# Patient Record
Sex: FEMALE | URBAN - METROPOLITAN AREA
[De-identification: names, ages, dates, MRNs, and addresses within clinical notes are randomized per-mention and may not be internally consistent; named-entity substitution may affect disease eponyms.]

---

## 2023-09-20 ENCOUNTER — TELEPHONE (OUTPATIENT)
Age: 38
End: 2023-09-20

## 2023-09-20 NOTE — TELEPHONE ENCOUNTER
----- Message from Thi Hema sent at 9/19/2023  2:49 PM EDT -----  Subject: Message to Provider    QUESTIONS  Information for Provider? 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 PCP comes highly recommend and Pt rally ants to est   new care with her. Please contact Pt to schedule a NP appt as soon as PCP   is accepting NP's before Dec if possible per Pt's request.   ---------------------------------------------------------------------------  --------------  Kyrstal Marker BRIANXE  [phone]; OK to leave message on voicemail,Do not leave any message,   patient will call back for answer  ---------------------------------------------------------------------------  --------------  SCRIPT ANSWERS  Relationship to Patient?  Self

## 2023-09-20 NOTE — TELEPHONE ENCOUNTER
This writer called the pt to inform her that Dr. Cristin Greene currently does not have any NP appts and she can call back at the end of October to attempt to schedule this appt.

## 2023-12-09 SDOH — HEALTH STABILITY: PHYSICAL HEALTH: ON AVERAGE, HOW MANY MINUTES DO YOU ENGAGE IN EXERCISE AT THIS LEVEL?: 30 MIN

## 2023-12-09 SDOH — HEALTH STABILITY: PHYSICAL HEALTH: ON AVERAGE, HOW MANY DAYS PER WEEK DO YOU ENGAGE IN MODERATE TO STRENUOUS EXERCISE (LIKE A BRISK WALK)?: 4 DAYS

## 2023-12-12 ENCOUNTER — OFFICE VISIT (OUTPATIENT)
Dept: PRIMARY CARE CLINIC | Facility: CLINIC | Age: 38
End: 2023-12-12
Payer: COMMERCIAL

## 2023-12-12 VITALS
WEIGHT: 175 LBS | OXYGEN SATURATION: 97 % | DIASTOLIC BLOOD PRESSURE: 88 MMHG | SYSTOLIC BLOOD PRESSURE: 136 MMHG | BODY MASS INDEX: 29.16 KG/M2 | TEMPERATURE: 97.1 F | RESPIRATION RATE: 12 BRPM | HEIGHT: 65 IN | HEART RATE: 60 BPM

## 2023-12-12 DIAGNOSIS — F90.9 ATTENTION DEFICIT HYPERACTIVITY DISORDER (ADHD), UNSPECIFIED ADHD TYPE: ICD-10-CM

## 2023-12-12 DIAGNOSIS — Z00.00 WELL WOMAN EXAM (NO GYNECOLOGICAL EXAM): ICD-10-CM

## 2023-12-12 DIAGNOSIS — Z13.1 SCREENING FOR DIABETES MELLITUS: ICD-10-CM

## 2023-12-12 DIAGNOSIS — Z11.3 SCREENING EXAMINATION FOR SEXUALLY TRANSMITTED DISEASE: ICD-10-CM

## 2023-12-12 DIAGNOSIS — Z13.220 SCREENING FOR HYPERLIPIDEMIA: ICD-10-CM

## 2023-12-12 DIAGNOSIS — Z23 NEED FOR IMMUNIZATION AGAINST INFLUENZA: Primary | ICD-10-CM

## 2023-12-12 DIAGNOSIS — R11.0 NAUSEA: ICD-10-CM

## 2023-12-12 LAB
ALBUMIN SERPL-MCNC: 4.2 G/DL (ref 3.5–5)
ALBUMIN/GLOB SERPL: 1.2 (ref 1.1–2.2)
ALP SERPL-CCNC: 60 U/L (ref 45–117)
ALT SERPL-CCNC: 16 U/L (ref 12–78)
ANION GAP SERPL CALC-SCNC: 2 MMOL/L (ref 5–15)
AST SERPL-CCNC: 14 U/L (ref 15–37)
BILIRUB SERPL-MCNC: 0.5 MG/DL (ref 0.2–1)
BUN SERPL-MCNC: 15 MG/DL (ref 6–20)
BUN/CREAT SERPL: 15 (ref 12–20)
CALCIUM SERPL-MCNC: 9.2 MG/DL (ref 8.5–10.1)
CHLORIDE SERPL-SCNC: 108 MMOL/L (ref 97–108)
CHOLEST SERPL-MCNC: 159 MG/DL
CO2 SERPL-SCNC: 28 MMOL/L (ref 21–32)
CREAT SERPL-MCNC: 0.98 MG/DL (ref 0.55–1.02)
GLOBULIN SER CALC-MCNC: 3.5 G/DL (ref 2–4)
GLUCOSE SERPL-MCNC: 91 MG/DL (ref 65–100)
HBV SURFACE AG SER QL: <0.1 INDEX
HBV SURFACE AG SER QL: NEGATIVE
HCG SERPL-ACNC: <1 MIU/ML (ref 0–6)
HDLC SERPL-MCNC: 46 MG/DL
HDLC SERPL: 3.5 (ref 0–5)
LDLC SERPL CALC-MCNC: 101.4 MG/DL (ref 0–100)
POTASSIUM SERPL-SCNC: 4.2 MMOL/L (ref 3.5–5.1)
PROT SERPL-MCNC: 7.7 G/DL (ref 6.4–8.2)
SODIUM SERPL-SCNC: 138 MMOL/L (ref 136–145)
TRIGL SERPL-MCNC: 58 MG/DL
VLDLC SERPL CALC-MCNC: 11.6 MG/DL

## 2023-12-12 PROCEDURE — 90674 CCIIV4 VAC NO PRSV 0.5 ML IM: CPT | Performed by: FAMILY MEDICINE

## 2023-12-12 PROCEDURE — 90471 IMMUNIZATION ADMIN: CPT | Performed by: FAMILY MEDICINE

## 2023-12-12 PROCEDURE — 99204 OFFICE O/P NEW MOD 45 MIN: CPT | Performed by: FAMILY MEDICINE

## 2023-12-12 RX ORDER — LISDEXAMFETAMINE DIMESYLATE 50 MG
CAPSULE ORAL
COMMUNITY
Start: 2023-08-01

## 2023-12-12 SDOH — ECONOMIC STABILITY: HOUSING INSECURITY
IN THE LAST 12 MONTHS, WAS THERE A TIME WHEN YOU DID NOT HAVE A STEADY PLACE TO SLEEP OR SLEPT IN A SHELTER (INCLUDING NOW)?: NO

## 2023-12-12 SDOH — ECONOMIC STABILITY: FOOD INSECURITY: WITHIN THE PAST 12 MONTHS, YOU WORRIED THAT YOUR FOOD WOULD RUN OUT BEFORE YOU GOT MONEY TO BUY MORE.: NEVER TRUE

## 2023-12-12 SDOH — ECONOMIC STABILITY: INCOME INSECURITY: HOW HARD IS IT FOR YOU TO PAY FOR THE VERY BASICS LIKE FOOD, HOUSING, MEDICAL CARE, AND HEATING?: NOT HARD AT ALL

## 2023-12-12 SDOH — ECONOMIC STABILITY: FOOD INSECURITY: WITHIN THE PAST 12 MONTHS, THE FOOD YOU BOUGHT JUST DIDN'T LAST AND YOU DIDN'T HAVE MONEY TO GET MORE.: NEVER TRUE

## 2023-12-12 ASSESSMENT — PATIENT HEALTH QUESTIONNAIRE - PHQ9
SUM OF ALL RESPONSES TO PHQ QUESTIONS 1-9: 0
SUM OF ALL RESPONSES TO PHQ QUESTIONS 1-9: 0
1. LITTLE INTEREST OR PLEASURE IN DOING THINGS: 0
SUM OF ALL RESPONSES TO PHQ QUESTIONS 1-9: 0
SUM OF ALL RESPONSES TO PHQ9 QUESTIONS 1 & 2: 0
2. FEELING DOWN, DEPRESSED OR HOPELESS: 0
SUM OF ALL RESPONSES TO PHQ QUESTIONS 1-9: 0

## 2023-12-12 NOTE — PROGRESS NOTES
HPI     Chief Complaint   Patient presents with    [facility]    Flu Vaccine     She is a 43 y.o. female who presents to [facility]. Establishing Care    Pmhx: Anxiety and depression, ADD. Asthma. Surghx reviewed. Fhx reviewed. Sochx reviewed. Depression and anxiety. Recently ended an unhealthy marriage and feels her mental health is much improved since that time. She has been seeing a behavioral health therapist.  Recently following with a NP in behavioral health. Recently started rx thc per her provider for anxiety. She was on fluvoxamine 100mg, but has not taken this in about a month. She does not want to restart a mood rx at this time. Enjoys yoga. Recently  from a same sex partner. Currently with a male partner. Interested in becoming pregnant. She is on PNVit. She is sober. Previous hx of alcohol abuse. UTD on pap smear, normal in 2019. C/o episodes of nausea in the past month. Lmp about a month ago. - Chronic medical problems:  Past Medical History:   Diagnosis Date    ADHD (attention deficit hyperactivity disorder) 2022 date completed neuropsych assessment    Anxiety 2000    Diagnosed formally in     Asthma     Recently changed diagnosis to vocal chord dysfunction    Depression 2000    Diagnosed formally in     Substance abuse ([facility])     Sober for 14 years (2009)     - Current medications:   Current Outpatient Medications   Medication Sig    VYVANSE 50 MG capsule      No current facility-administered medications for this visit.      - Family history:   Family History   Problem Relation Age of Onset    Depression Mother     Other Mother         OCD    Alcohol Abuse Father         Sober ~ 15 years    Depression Father     Prostate Cancer Maternal Grandfather     Other Maternal Grandfather          from dementia    Other Maternal Grandmother          from stroke    Other Paternal Grandmother          from brain

## 2023-12-13 LAB
HCV AB SER IA-ACNC: 0.09 INDEX
HCV AB SERPL QL IA: NONREACTIVE
HIV 1+2 AB+HIV1 P24 AG SERPL QL IA: NONREACTIVE
HIV 1/2 RESULT COMMENT: NORMAL

## 2023-12-15 LAB
C TRACH RRNA SPEC QL NAA+PROBE: NEGATIVE
N GONORRHOEA RRNA SPEC QL NAA+PROBE: NEGATIVE
SPECIMEN SOURCE: NORMAL
T PALLIDUM AB SER QL IA: NON REACTIVE
T VAGINALIS RRNA SPEC QL NAA+PROBE: NEGATIVE

## 2023-12-19 ENCOUNTER — TELEPHONE (OUTPATIENT)
Dept: PRIMARY CARE CLINIC | Facility: CLINIC | Age: 38
End: 2023-12-19

## 2023-12-19 NOTE — TELEPHONE ENCOUNTER
----- Message from [address] [facility] [address] sent at 12/19/2023  2:54 PM EST -----  Subject: Referral Request    Reason for referral request? pregnancy   Provider patient wants to be referred to(if known):     Provider Phone Number(if known): Additional Information for Provider? Brody Whiteside tested positive for pregnancy   and would like labs done to confirm.  Thank you   ---------------------------------------------------------------------------  --------------  Sherly Green INFO    [phone]; OK to leave message on voicemail  ---------------------------------------------------------------------------  --------------

## 2023-12-19 NOTE — TELEPHONE ENCOUNTER
Spoke to patient gave contact information for [employer]. Advised she can make appointment and they will do pregnancy labs.

## 2024-03-05 ENCOUNTER — TELEPHONE (OUTPATIENT)
Dept: PRIMARY CARE CLINIC | Facility: CLINIC | Age: 39
End: 2024-03-05

## 2024-03-05 DIAGNOSIS — Z34.90 PREGNANCY, UNSPECIFIED GESTATIONAL AGE: Primary | ICD-10-CM

## 2024-03-05 NOTE — TELEPHONE ENCOUNTER
Patient is 15 weeks pregnant and needs a referral to a birth center ([facility]). Patient is working with a joselo who specializes in insurance authorizations since [facility] is OON and the referral allows them to apply for an exception.    The joselo is Louis Escobar, email: [email or web address]. No phone number provided.    Patient has an appt on 3/8 at 3:00 pm, but if not necessary, patient would appreciate a call.

## 2024-03-14 ENCOUNTER — TELEPHONE (OUTPATIENT)
Dept: PRIMARY CARE CLINIC | Facility: CLINIC | Age: 39
End: 2024-03-14

## 2024-03-14 NOTE — TELEPHONE ENCOUNTER
Called Louis Escobar with the [facility] and left a message asking what exactly does the referral need to have on and the correct fax number to fax it back

## 2024-03-18 ENCOUNTER — TELEPHONE (OUTPATIENT)
Dept: PRIMARY CARE CLINIC | Facility: CLINIC | Age: 39
End: 2024-03-18

## 2024-03-18 NOTE — TELEPHONE ENCOUNTER
Patient Birthing center called saying that the patient need authorization exception because this facility is out of network. Called patient insurance. Spoke with multiple people to be told that the only cpt code needing authorization was 87464. Explain to last person I spoke to that this was out of network will this need authorization she stated since not sure this will be use during birth we can't approve at this time. That the authorization be can backed date to the date of birth of infant. Called [facility] that does the billing for the birthing center explain the information that was told to me. She did not like that there was no authorization for payment for the all CPT codes because this location is out of network. She stated she would call see what was going on and call back if anything else was needed from our office.